# Patient Record
(demographics unavailable — no encounter records)

---

## 2017-04-25 NOTE — OPERATIVE REPORT
Operative Report


DATE OF SURGERY: 04/25/17


Operative Report: 


The risks benefits and alternatives of the procedure explained to the patient 

in detail and informed consent is obtained that GIF Olympus video scope was 

inserted into the patient's mouth and hypopharynx the esophagus is identified 

intubated and insufflated the scope was then advanced through the esophagus 

stomach and duodenum retroflexion maneuver is done the esophagus stomach and 

first and second portions of the duodenum examined


PREOPERATIVE DIAGNOSIS: Epigastric pain


POSTOPERATIVE DIAGNOSIS: Gastritis.  Duodenitis


OPERATION: EGD with biopsy


SURGEON: NATALIA PRADO


ANESTHESIA: Other - no sedation provided. patient wanted it done without 

sedation


TISSUE REMOVED OR ALTERED: Gastric mucosal specimen obtained rule out 

Helicobacter pylori


COMPLICATIONS: 


None.


ESTIMATED BLOOD LOSS: none.


INTRAOPERATIVE FINDINGS: As described above.  Esophagus normal.  First and 

second portions of the duodenum showing slight irritation


PROCEDURE: 


Patient tolerated the procedure well.


No immediate postprocedure complications noted.


Patient discharged in good condition.


Discharge date 04/25/2017.


Discharge diet: Regular.


Discharge activity: Regular.


2-3 week follow-up to discuss findings.


Patient is instructed to the emergency room or call the office should there be 

any further problems or questions.


We'll await on biopsies.